# Patient Record
(demographics unavailable — no encounter records)

---

## 2024-11-12 NOTE — PROCEDURE
[FreeTextEntry6] : Preop dx: lipoma, forehead.  Postopdx: same Procedure: excision 1.1cm lipoma forehead and complex closure of forehead, 1.1cm Anesthesia: local 1% w/ epi Specimens: to path Procedure: 	IC obtained. Lesion demarcated.  Lido 1% w/ epi injected.  15 blade used to incise skin.  Lesion encountered in the subfascial plane and dissected circumferentially. Removed in its entirety, 1.1cm.  Skin edges widely undermined and closed in layers with monocryl for a 1.1cm complex closure. Mastisol and steristrips placed.  No complications.  Specimen sent to path

## 2024-12-11 NOTE — HISTORY OF PRESENT ILLNESS
[FreeTextEntry1] : Dop:11/12/24 S/P: excision and biopsy mass on left forehead. Path: Fragment of benign adipose tissue compatible with lipoma No excessive bleeding, No fevers, No Odor, No purulent discharge, No excessive pain. Patient is doing well, No complaints.